# Patient Record
Sex: MALE | Race: WHITE
[De-identification: names, ages, dates, MRNs, and addresses within clinical notes are randomized per-mention and may not be internally consistent; named-entity substitution may affect disease eponyms.]

---

## 2018-05-21 ENCOUNTER — HOSPITAL ENCOUNTER (EMERGENCY)
Dept: HOSPITAL 92 - ERS | Age: 68
End: 2018-05-21
Payer: COMMERCIAL
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
## 2018-05-21 DIAGNOSIS — I46.9: Primary | ICD-10-CM

## 2018-05-21 PROCEDURE — 96374 THER/PROPH/DIAG INJ IV PUSH: CPT

## 2018-05-21 PROCEDURE — 71045 X-RAY EXAM CHEST 1 VIEW: CPT

## 2018-05-21 PROCEDURE — 36430 TRANSFUSION BLD/BLD COMPNT: CPT

## 2018-05-21 PROCEDURE — 92950 HEART/LUNG RESUSCITATION CPR: CPT

## 2018-05-21 PROCEDURE — G0390 TRAUMA RESPONS W/HOSP CRITI: HCPCS

## 2018-05-21 PROCEDURE — 96375 TX/PRO/DX INJ NEW DRUG ADDON: CPT

## 2018-05-21 PROCEDURE — 96376 TX/PRO/DX INJ SAME DRUG ADON: CPT

## 2018-05-21 NOTE — RAD
PORTABLE SUPINE CHEST:

 

Date:  05/21/18 

 

PROVIDED CLINICAL HISTORY:   

Trauma. 

 

FINDINGS:

Multiple left-sided rib fractures are seen. There is hazy opacity overlying much of the left chest li
ramez reflecting pleural fluid. Supine nature of the examination is not sensitive for detection of ple
ural fluid or pneumothorax. Cardiac and mediastinal silhouette are not well evaluated. 

 

IMPRESSION: 

Multiple left-sided rib fractures and pleural and/or parenchymal opacity involving the left hemithora
x. 

 

 

POS: ASHA
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
